# Patient Record
Sex: MALE | Race: WHITE | NOT HISPANIC OR LATINO | URBAN - METROPOLITAN AREA
[De-identification: names, ages, dates, MRNs, and addresses within clinical notes are randomized per-mention and may not be internally consistent; named-entity substitution may affect disease eponyms.]

---

## 2020-03-17 ENCOUNTER — INPATIENT (INPATIENT)
Facility: HOSPITAL | Age: 50
LOS: 0 days | Discharge: HOME | End: 2020-03-18
Attending: SURGERY | Admitting: SURGERY
Payer: COMMERCIAL

## 2020-03-17 VITALS
RESPIRATION RATE: 20 BRPM | HEART RATE: 88 BPM | TEMPERATURE: 99 F | OXYGEN SATURATION: 100 % | DIASTOLIC BLOOD PRESSURE: 84 MMHG | SYSTOLIC BLOOD PRESSURE: 167 MMHG

## 2020-03-17 PROCEDURE — 99285 EMERGENCY DEPT VISIT HI MDM: CPT

## 2020-03-17 RX ORDER — TETANUS TOXOID, REDUCED DIPHTHERIA TOXOID AND ACELLULAR PERTUSSIS VACCINE, ADSORBED 5; 2.5; 8; 8; 2.5 [IU]/.5ML; [IU]/.5ML; UG/.5ML; UG/.5ML; UG/.5ML
0.5 SUSPENSION INTRAMUSCULAR ONCE
Refills: 0 | Status: COMPLETED | OUTPATIENT
Start: 2020-03-17 | End: 2020-03-17

## 2020-03-17 RX ORDER — SODIUM CHLORIDE 9 MG/ML
1000 INJECTION INTRAMUSCULAR; INTRAVENOUS; SUBCUTANEOUS ONCE
Refills: 0 | Status: COMPLETED | OUTPATIENT
Start: 2020-03-17 | End: 2020-03-17

## 2020-03-18 VITALS
TEMPERATURE: 98 F | SYSTOLIC BLOOD PRESSURE: 124 MMHG | HEART RATE: 70 BPM | DIASTOLIC BLOOD PRESSURE: 76 MMHG | RESPIRATION RATE: 19 BRPM

## 2020-03-18 LAB
ALBUMIN SERPL ELPH-MCNC: 4.2 G/DL — SIGNIFICANT CHANGE UP (ref 3.5–5.2)
ALP SERPL-CCNC: 88 U/L — SIGNIFICANT CHANGE UP (ref 30–115)
ALT FLD-CCNC: 25 U/L — SIGNIFICANT CHANGE UP (ref 0–41)
ANION GAP SERPL CALC-SCNC: 12 MMOL/L — SIGNIFICANT CHANGE UP (ref 7–14)
APTT BLD: 30 SEC — SIGNIFICANT CHANGE UP (ref 27–39.2)
AST SERPL-CCNC: 23 U/L — SIGNIFICANT CHANGE UP (ref 0–41)
BASOPHILS # BLD AUTO: 0.03 K/UL — SIGNIFICANT CHANGE UP (ref 0–0.2)
BASOPHILS NFR BLD AUTO: 0.4 % — SIGNIFICANT CHANGE UP (ref 0–1)
BILIRUB SERPL-MCNC: 0.5 MG/DL — SIGNIFICANT CHANGE UP (ref 0.2–1.2)
BLD GP AB SCN SERPL QL: SIGNIFICANT CHANGE UP
BUN SERPL-MCNC: 16 MG/DL — SIGNIFICANT CHANGE UP (ref 10–20)
CALCIUM SERPL-MCNC: 8.9 MG/DL — SIGNIFICANT CHANGE UP (ref 8.5–10.1)
CHLORIDE SERPL-SCNC: 104 MMOL/L — SIGNIFICANT CHANGE UP (ref 98–110)
CO2 SERPL-SCNC: 25 MMOL/L — SIGNIFICANT CHANGE UP (ref 17–32)
CREAT SERPL-MCNC: 1.2 MG/DL — SIGNIFICANT CHANGE UP (ref 0.7–1.5)
EOSINOPHIL # BLD AUTO: 0.13 K/UL — SIGNIFICANT CHANGE UP (ref 0–0.7)
EOSINOPHIL NFR BLD AUTO: 1.6 % — SIGNIFICANT CHANGE UP (ref 0–8)
ETHANOL SERPL-MCNC: <10 MG/DL — SIGNIFICANT CHANGE UP
GLUCOSE SERPL-MCNC: 107 MG/DL — HIGH (ref 70–99)
HCT VFR BLD CALC: 47.2 % — SIGNIFICANT CHANGE UP (ref 42–52)
HGB BLD-MCNC: 16.6 G/DL — SIGNIFICANT CHANGE UP (ref 14–18)
IMM GRANULOCYTES NFR BLD AUTO: 0.1 % — SIGNIFICANT CHANGE UP (ref 0.1–0.3)
INR BLD: 1.17 RATIO — SIGNIFICANT CHANGE UP (ref 0.65–1.3)
LACTATE SERPL-SCNC: 1.8 MMOL/L — SIGNIFICANT CHANGE UP (ref 0.7–2)
LIDOCAIN IGE QN: 24 U/L — SIGNIFICANT CHANGE UP (ref 7–60)
LYMPHOCYTES # BLD AUTO: 3.35 K/UL — SIGNIFICANT CHANGE UP (ref 1.2–3.4)
LYMPHOCYTES # BLD AUTO: 40.4 % — SIGNIFICANT CHANGE UP (ref 20.5–51.1)
MCHC RBC-ENTMCNC: 32.2 PG — HIGH (ref 27–31)
MCHC RBC-ENTMCNC: 35.2 G/DL — SIGNIFICANT CHANGE UP (ref 32–37)
MCV RBC AUTO: 91.5 FL — SIGNIFICANT CHANGE UP (ref 80–94)
MONOCYTES # BLD AUTO: 0.77 K/UL — HIGH (ref 0.1–0.6)
MONOCYTES NFR BLD AUTO: 9.3 % — SIGNIFICANT CHANGE UP (ref 1.7–9.3)
NEUTROPHILS # BLD AUTO: 4.01 K/UL — SIGNIFICANT CHANGE UP (ref 1.4–6.5)
NEUTROPHILS NFR BLD AUTO: 48.2 % — SIGNIFICANT CHANGE UP (ref 42.2–75.2)
NRBC # BLD: 0 /100 WBCS — SIGNIFICANT CHANGE UP (ref 0–0)
PLATELET # BLD AUTO: 190 K/UL — SIGNIFICANT CHANGE UP (ref 130–400)
POTASSIUM SERPL-MCNC: 3.3 MMOL/L — LOW (ref 3.5–5)
POTASSIUM SERPL-SCNC: 3.3 MMOL/L — LOW (ref 3.5–5)
PROT SERPL-MCNC: 6.4 G/DL — SIGNIFICANT CHANGE UP (ref 6–8)
PROTHROM AB SERPL-ACNC: 13.5 SEC — HIGH (ref 9.95–12.87)
RBC # BLD: 5.16 M/UL — SIGNIFICANT CHANGE UP (ref 4.7–6.1)
RBC # FLD: 13.1 % — SIGNIFICANT CHANGE UP (ref 11.5–14.5)
SODIUM SERPL-SCNC: 141 MMOL/L — SIGNIFICANT CHANGE UP (ref 135–146)
WBC # BLD: 8.3 K/UL — SIGNIFICANT CHANGE UP (ref 4.8–10.8)
WBC # FLD AUTO: 8.3 K/UL — SIGNIFICANT CHANGE UP (ref 4.8–10.8)

## 2020-03-18 PROCEDURE — 99223 1ST HOSP IP/OBS HIGH 75: CPT

## 2020-03-18 PROCEDURE — 74174 CTA ABD&PLVS W/CONTRAST: CPT | Mod: 26

## 2020-03-18 PROCEDURE — 71045 X-RAY EXAM CHEST 1 VIEW: CPT | Mod: 26

## 2020-03-18 PROCEDURE — 71275 CT ANGIOGRAPHY CHEST: CPT | Mod: 26

## 2020-03-18 RX ORDER — INFLUENZA VIRUS VACCINE 15; 15; 15; 15 UG/.5ML; UG/.5ML; UG/.5ML; UG/.5ML
0.5 SUSPENSION INTRAMUSCULAR ONCE
Refills: 0 | Status: DISCONTINUED | OUTPATIENT
Start: 2020-03-18 | End: 2020-03-18

## 2020-03-18 RX ORDER — POTASSIUM CHLORIDE 20 MEQ
20 PACKET (EA) ORAL
Refills: 0 | Status: COMPLETED | OUTPATIENT
Start: 2020-03-18 | End: 2020-03-18

## 2020-03-18 RX ORDER — CEFAZOLIN SODIUM 1 G
2000 VIAL (EA) INJECTION ONCE
Refills: 0 | Status: COMPLETED | OUTPATIENT
Start: 2020-03-18 | End: 2020-03-18

## 2020-03-18 RX ADMIN — Medication 50 MILLIEQUIVALENT(S): at 11:31

## 2020-03-18 RX ADMIN — SODIUM CHLORIDE 1000 MILLILITER(S): 9 INJECTION INTRAMUSCULAR; INTRAVENOUS; SUBCUTANEOUS at 00:27

## 2020-03-18 RX ADMIN — Medication 50 MILLIEQUIVALENT(S): at 08:09

## 2020-03-18 RX ADMIN — TETANUS TOXOID, REDUCED DIPHTHERIA TOXOID AND ACELLULAR PERTUSSIS VACCINE, ADSORBED 0.5 MILLILITER(S): 5; 2.5; 8; 8; 2.5 SUSPENSION INTRAMUSCULAR at 00:27

## 2020-03-18 RX ADMIN — Medication 100 MILLIGRAM(S): at 03:10

## 2020-03-18 RX ADMIN — Medication 50 MILLIEQUIVALENT(S): at 05:40
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
## 2020-03-18 NOTE — H&P ADULT - NSHPPHYSICALEXAM_GEN_ALL_CORE
Vital Signs Last 24 Hrs  T(C): 37 (17 Mar 2020 23:47), Max: 37 (17 Mar 2020 23:47)  T(F): 98.6 (17 Mar 2020 23:47), Max: 98.6 (17 Mar 2020 23:47)  HR: 86 (18 Mar 2020 00:10) (85 - 88)  BP: 142/97 (18 Mar 2020 00:10) (142/97 - 167/84)  RR: 14 (18 Mar 2020 00:10) (14 - 23)  SpO2: 100% (18 Mar 2020 00:10) (99% - 100%)    PHYSICAL EXAM:  Constitutional: AAOx3, NAD, calm and cooperative  HEENT: DEBORAH, EOMI, NCAT, trachea Midline, neck supple, no cervical tenderness  Respiratory: CTABL, good air entry, normal respiratory effort, no W/R/C  Cardiovascular: RRR, S1, S2, no M/R/G  Gastrointestinal: laceration sustained to R periumbilical area 4.5cm transverse dimension with exposed subcutaneous fat, oozing, no pulsatile bleed, expanding hematoma, evisceration, succus, or apparent violation of fascia; abdomen is soft, non-tender, non-distended, +BS, no rebound tenderness or guarding.  Genitourinary: no trauma to perineum or groins, normal male genitalia  Rectal: normal tone, no blood, +soft stool  Extremities: FROM, Motor 5/5, compartments soft, no extremity signs of trauma  Vascular: peripheral pulses 2+ throughout, palpable; extremities warm  Neurological: GCS 15, AAOx3, peripheral sensation intact and equal throughout. No spinal or perispinal tenderness, step-offs, or deformities  Skin: Laceration as described, no other lacerations, ecchymosis, or abrasions, skin normal color, warm, dry

## 2020-03-18 NOTE — H&P ADULT - NSHPLABSRESULTS_GEN_ALL_CORE
LABS:  CAPILLARY BLOOD GLUCOSE                        16.6   8.30  )-----------( 190      ( 17 Mar 2020 23:50 )             47.2     03-17  141  |  104  |  16  ----------------------------<  107<H>  3.3<L>   |  25  |  1.2    Ca    8.9      17 Mar 2020 23:50    TPro  6.4  /  Alb  4.2  /  TBili  0.5  /  DBili  x   /  AST  23  /  ALT  25  /  AlkPhos  88  03-17  Lipase, Serum: 24 U/L (03-17-20 @ 23:50)  Lactate, Blood: 1.8 mmol/L (03-17-20 @ 23:50)    PT/INR - ( 17 Mar 2020 23:50 )   PT: 13.50 sec;   INR: 1.17 ratio    PTT - ( 17 Mar 2020 23:50 )  PTT:30.0 sec    Alcohol, Blood: <10 mg/dL (03-17-20 @ 23:50)    IMAGING  < from: CT Angio Abdomen and Pelvis w/ IV Cont (03.18.20 @ 00:44) >  IMPRESSION:   Periumbilical skin laceration extending into mesenteric fat without evidence of active arterial extravasation or visceral organ injury.  < end of copied text >
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
## 2020-03-18 NOTE — CHART NOTE - NSCHARTNOTEFT_GEN_A_CORE
Patient was seen and examined at bedside. VItal and PE as below. Overall exam unchanged.    T(F): 97.7 (03-18-20 @ 04:21), Max: 98.6 (03-17-20 @ 23:47)  HR: 60 (03-18-20 @ 04:21) (60 - 88)  BP: 128/77 (03-18-20 @ 04:21) (122/79 - 167/84)  RR: 19 (03-18-20 @ 04:21) (14 - 23)  SpO2: 100% (03-18-20 @ 01:28) (99% - 100%)    PHYSICAL EXAM:  GENERAL: NAD, well-appearing  Abdomen: Soft, NTND, no guarding or rigidity, unchanged from previous exam, laceration R periumbilical area packed and dressed with gauze and tape, mild saturation, no active bleeding noted.
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
## 2020-03-18 NOTE — DISCHARGE NOTE PROVIDER - CARE PROVIDER_API CALL
Nick Gan)  Surgery; Surgical Critical Care  [address] [place] [address], [address] [place] [address]  Phone: [phone]  Fax: [phone]  Follow Up Time: 2 weeks

## 2020-03-18 NOTE — DISCHARGE NOTE NURSING/CASE MANAGEMENT/SOCIAL WORK - PATIENT PORTAL LINK FT
You can access the FollowMyHealth Patient Portal offered by [facility] by registering at the following website: [email or web address]. By joining [employer]’s FollowMyHealth portal, you will also be able to view your health information using other applications (apps) compatible with our system.
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
## 2020-03-18 NOTE — CHART NOTE - NSCHARTNOTEFT_GEN_A_CORE
Patient was seen and examined at bedside. Vitals and PE documented below.    T(F): 98.5 (03-18-20 @ 01:28), Max: 98.6 (03-17-20 @ 23:47)  HR: 78 (03-18-20 @ 01:28) (78 - 88)  BP: 137/86 (03-18-20 @ 01:28) (137/86 - 167/84)  RR: 20 (03-18-20 @ 01:28) (14 - 23)  SpO2: 100% (03-18-20 @ 01:28) (99% - 100%)      PHYSICAL EXAM:  GENERAL: NAD, well-appearing  Abdomen: Soft, NTND, no guarding or rigidity, unchanged from previous exam, laceration R periumbilical area packed and dressed with gauze and tape, mild saturation, no active bleeding noted.
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
## 2020-03-18 NOTE — ED PROVIDER NOTE - PHYSICAL EXAMINATION
VITALS:  I have reviewed the initial vital signs.  GENERAL: Well-developed, well-nourished, in no acute distress.  HEENT: NC/AT. Sclera clear. EOMI, PERRLA. No epistaxis. No malocclusion. No facial bony ttp.  NECK: supple w FROM. No paraspinal muscle ttp. No midline cervical spinous tenderness, step offs, or deformity.  CARDIO: RRR, nl S1 and S2. No murmurs, rubs, or gallops. 2+ radial and pedal pulses bilaterally. No chest wall tenderness.  PULM: Normal effort. No tachypnea or retractions. No flail chest. CTA b/l without wheezes, rales, or rhonchi. Good air entry b/l.  MSK: FROM to extremities x4 w/o swelling/erythema/ecchymosis/deformity/ttp. No midline thoracic or lumbar spinous tenderness, step offs, or deformity. Pelvis stable.   GI: Abdomen soft and non-distended. Nontender throughout.  SKIN: Warm, dry. 6 cm linear lac just below umbilicus with active bleeding. +subcutaneous fat visualized, , qtip enters approximately 3 cm past epidermis. No bubbling or extrusion of bowel.  NEURO: A&Ox3. Speech clear. No gross motor/sensory deficits.

## 2020-03-18 NOTE — ED PROVIDER NOTE - NS ED ROS FT
EYES: (-) vision changes, (-) blurry vision, (-) double vision, (-) photophobia, (-) eye pain  ENT: (-) difficulty swallowing, (-) facial swelling  NECK: (-) neck pain, (-) neck stiffness  CARDIO: (-) chest pain, (-) palpitations  PULM: (-) cough, (-) sputum, (-) shortness of breath, (-) wheezing, (-) hemoptysis, (-) stridor  GI: see HPI, (-) nausea, (-) vomiting, (-) bowel incontinence/retention  : (-) incontinence (-) flank pain  HEME: (-) easy bruising, (-) easy bleeding  MSK: (-) back pain, (-) joint pain, (-) deformity, (-) joint swelling, (-) gait difficulty  SKIN: see HPI, (-) ecchymosis,  NEURO: (-) headache, (-) head injury, (-) LOC, (-) dizziness, (-) lightheadedness, (-) syncope, (-) speech changes, (-) confusion, (-) numbness, (-) weakness, (-) paresthesias, (-) seizures    *all other systems negative except as documented above and in the HPI*
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
## 2020-03-18 NOTE — ED ADULT NURSE NOTE - NSHOSCREENINGQ1_ED_ALL_ED
No Pt on Xarelto.  Per pt, he has appt with Dr. Tate, PMD/ Cardiologist for clearance.  Dr. Tate to advise ot regarding when to stop Xarelto.

## 2020-03-20 DIAGNOSIS — S31.615A: ICD-10-CM
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
## 2020-11-30 ENCOUNTER — EMERGENCY (EMERGENCY)
Facility: HOSPITAL | Age: 50
LOS: 0 days | Discharge: HOME | End: 2020-11-30
Attending: EMERGENCY MEDICINE | Admitting: EMERGENCY MEDICINE
Payer: COMMERCIAL

## 2020-11-30 VITALS
HEART RATE: 87 BPM | DIASTOLIC BLOOD PRESSURE: 80 MMHG | RESPIRATION RATE: 19 BRPM | SYSTOLIC BLOOD PRESSURE: 122 MMHG | TEMPERATURE: 103 F

## 2020-11-30 DIAGNOSIS — U07.1 COVID-19: ICD-10-CM

## 2020-11-30 DIAGNOSIS — R50.9 FEVER, UNSPECIFIED: ICD-10-CM

## 2020-11-30 PROBLEM — Z78.9 OTHER SPECIFIED HEALTH STATUS: Chronic | Status: ACTIVE | Noted: 2020-03-18

## 2020-11-30 LAB
ALBUMIN SERPL ELPH-MCNC: 4.1 G/DL — SIGNIFICANT CHANGE UP (ref 3.5–5.2)
ALP SERPL-CCNC: 94 U/L — SIGNIFICANT CHANGE UP (ref 30–115)
ALT FLD-CCNC: 20 U/L — SIGNIFICANT CHANGE UP (ref 0–41)
ANION GAP SERPL CALC-SCNC: 11 MMOL/L — SIGNIFICANT CHANGE UP (ref 7–14)
AST SERPL-CCNC: 21 U/L — SIGNIFICANT CHANGE UP (ref 0–41)
BASOPHILS # BLD AUTO: 0 K/UL — SIGNIFICANT CHANGE UP (ref 0–0.2)
BASOPHILS NFR BLD AUTO: 0 % — SIGNIFICANT CHANGE UP (ref 0–1)
BILIRUB SERPL-MCNC: 0.3 MG/DL — SIGNIFICANT CHANGE UP (ref 0.2–1.2)
BUN SERPL-MCNC: 12 MG/DL — SIGNIFICANT CHANGE UP (ref 10–20)
CALCIUM SERPL-MCNC: 9 MG/DL — SIGNIFICANT CHANGE UP (ref 8.5–10.1)
CHLORIDE SERPL-SCNC: 97 MMOL/L — LOW (ref 98–110)
CO2 SERPL-SCNC: 27 MMOL/L — SIGNIFICANT CHANGE UP (ref 17–32)
CREAT SERPL-MCNC: 1.3 MG/DL — SIGNIFICANT CHANGE UP (ref 0.7–1.5)
EOSINOPHIL # BLD AUTO: 0 K/UL — SIGNIFICANT CHANGE UP (ref 0–0.7)
EOSINOPHIL NFR BLD AUTO: 0 % — SIGNIFICANT CHANGE UP (ref 0–8)
GLUCOSE SERPL-MCNC: 113 MG/DL — HIGH (ref 70–99)
HCT VFR BLD CALC: 52.9 % — HIGH (ref 42–52)
HGB BLD-MCNC: 18.1 G/DL — HIGH (ref 14–18)
LYMPHOCYTES # BLD AUTO: 1.21 K/UL — SIGNIFICANT CHANGE UP (ref 1.2–3.4)
LYMPHOCYTES # BLD AUTO: 33 % — SIGNIFICANT CHANGE UP (ref 20.5–51.1)
MANUAL SMEAR VERIFICATION: SIGNIFICANT CHANGE UP
MCHC RBC-ENTMCNC: 31.3 PG — HIGH (ref 27–31)
MCHC RBC-ENTMCNC: 34.2 G/DL — SIGNIFICANT CHANGE UP (ref 32–37)
MCV RBC AUTO: 91.5 FL — SIGNIFICANT CHANGE UP (ref 80–94)
MONOCYTES # BLD AUTO: 0.26 K/UL — SIGNIFICANT CHANGE UP (ref 0.1–0.6)
MONOCYTES NFR BLD AUTO: 7 % — SIGNIFICANT CHANGE UP (ref 1.7–9.3)
NEUTROPHILS # BLD AUTO: 2.21 K/UL — SIGNIFICANT CHANGE UP (ref 1.4–6.5)
NEUTROPHILS NFR BLD AUTO: 60 % — SIGNIFICANT CHANGE UP (ref 42.2–75.2)
NRBC # BLD: 0 /100 — SIGNIFICANT CHANGE UP (ref 0–0)
NRBC # BLD: SIGNIFICANT CHANGE UP /100 WBCS (ref 0–0)
PLAT MORPH BLD: NORMAL — SIGNIFICANT CHANGE UP
PLATELET # BLD AUTO: 113 K/UL — LOW (ref 130–400)
POTASSIUM SERPL-MCNC: 4.5 MMOL/L — SIGNIFICANT CHANGE UP (ref 3.5–5)
POTASSIUM SERPL-SCNC: 4.5 MMOL/L — SIGNIFICANT CHANGE UP (ref 3.5–5)
PROT SERPL-MCNC: 7 G/DL — SIGNIFICANT CHANGE UP (ref 6–8)
RBC # BLD: 5.78 M/UL — SIGNIFICANT CHANGE UP (ref 4.7–6.1)
RBC # FLD: 12.9 % — SIGNIFICANT CHANGE UP (ref 11.5–14.5)
RBC BLD AUTO: NORMAL — SIGNIFICANT CHANGE UP
SODIUM SERPL-SCNC: 135 MMOL/L — SIGNIFICANT CHANGE UP (ref 135–146)
WBC # BLD: 3.68 K/UL — LOW (ref 4.8–10.8)
WBC # FLD AUTO: 3.68 K/UL — LOW (ref 4.8–10.8)

## 2020-11-30 PROCEDURE — 93010 ELECTROCARDIOGRAM REPORT: CPT

## 2020-11-30 PROCEDURE — 71045 X-RAY EXAM CHEST 1 VIEW: CPT | Mod: 26

## 2020-11-30 PROCEDURE — 99284 EMERGENCY DEPT VISIT MOD MDM: CPT

## 2020-11-30 RX ORDER — ACETAMINOPHEN 500 MG
650 TABLET ORAL ONCE
Refills: 0 | Status: COMPLETED | OUTPATIENT
Start: 2020-11-30 | End: 2020-11-30

## 2020-11-30 RX ORDER — SODIUM CHLORIDE 9 MG/ML
1000 INJECTION, SOLUTION INTRAVENOUS ONCE
Refills: 0 | Status: COMPLETED | OUTPATIENT
Start: 2020-11-30 | End: 2020-11-30

## 2020-11-30 RX ADMIN — Medication 650 MILLIGRAM(S): at 04:37

## 2020-11-30 RX ADMIN — SODIUM CHLORIDE 1000 MILLILITER(S): 9 INJECTION, SOLUTION INTRAVENOUS at 04:38

## 2020-11-30 NOTE — ED PROVIDER NOTE - NSFOLLOWUPINSTRUCTIONS_ED_ALL_ED_FT
You have tested POSITIVE for the novel coronavirus (COVID-19). Upon discharge, you must self-quarantine for 14 days, or until the Department of Health contacts you. Please wear a face mask if you are around other individuals. Try to avoid contact with house members, family, and friends for the duration of this quarantine. Please follow up with your primary care physician within 2-3 weeks of your discharge from [facility]. Please take all medications as prescribed. If you experience any worsening or recurrence of your symptoms please call 9-1-1 immediately or report to the nearest Emergency Department. If you have any questions or concerns, please do not hesitate to call the hospital at [phone].

## 2020-11-30 NOTE — ED PROVIDER NOTE - PATIENT PORTAL LINK FT
You can access the FollowMyHealth Patient Portal offered by [facility] by registering at the following website: [email or web address]. By joining [employer]’s FollowMyHealth portal, you will also be able to view your health information using other applications (apps) compatible with our system.

## 2020-11-30 NOTE — ED PROVIDER NOTE - ATTENDING CONTRIBUTION TO CARE
I personally evaluated the patient. I reviewed the Resident’s or Physician Assistant’s note (as assigned above), and agree with the findings and plan except as documented in my note.    Pt is a 49 y/o male with no PMH who presents to ED for 8 days of cough, SOb, fever, body aches, mild, constant, unchanged since onset. Tested positive for COVID 8 days ago. No diarrhea, vomiting, chest pain. Pt has pulse ox at home, has been between 94-96.    Constitutional: Well developed, well nourished. NAD.  Head: Normocephalic, atraumatic.  Eyes: PERRL. EOMI.  ENT: No nasal discharge. Mucous membranes moist.  Neck: Supple. Painless ROM.  Cardiovascular: Normal S1, S2. Regular rate and rhythm. No murmurs, rubs, or gallops.  Pulmonary: Normal respiratory rate and effort. Lungs clear to auscultation bilaterally. No wheezing, rales, or rhonchi.  Abdominal: Soft. Nondistended. Nontender. No rebound, guarding, rigidity.  Extremities. Pelvis stable. No lower extremity edema, symmetric calves.  Skin: No rashes, cyanosis.  Neuro: AAOx3. No focal neurological deficits.  Psych: Normal mood. Normal affect.

## 2020-11-30 NOTE — ED PROVIDER NOTE - NS ED ROS FT
Eyes:  No visual changes, eye pain or discharge.  ENMT:  No hearing changes, pain, discharge or infections. No neck pain or stiffness.  Cardiac:  No chest pain, SOB or edema. No chest pain with exertion.  Respiratory:  +cough. No respiratory distress. No hemoptysis. No history of asthma or RAD.  GI:  No nausea, vomiting, diarrhea or abdominal pain.  :  No dysuria, frequency or burning.  MS:  +myalgias  Neuro:  +headache. No LOC.  Skin:  No skin rash.   Endocrine: No history of thyroid disease or diabetes.

## 2020-11-30 NOTE — ED PROVIDER NOTE - OBJECTIVE STATEMENT
51 y/o M with no pmh presenting for fever, cough. Patient tested + for COVID this past Tuesday. States that he has been experiencing dry cough, myalgias, headache, fever x8 days. Cough is not productive. Denies SOB, no CP, no n/v, diarrhea, abd. pain. Has been taking Tylenol but w/ minimal relief. Lives w/ wife at home who is also COVID+. O2 sat. here is 95% on RA; O2 sat. remained at 95-96% while marching in place.

## 2020-11-30 NOTE — ED PROVIDER NOTE - CLINICAL SUMMARY MEDICAL DECISION MAKING FREE TEXT BOX
Pt presented to ED for SOB, cough, fever. + COVID. Labs, imaging obtained. VSS. Pulse ox above 94 when ambulating. Pt will get pulse ox with dc. Results reviewed and discussed with pt and printed for patient. Anticipatory guidance given including close outpatient followup. Strict return precautions given. Pt verbalizes understanding of and agrees with plan.

## 2020-12-28 PROBLEM — Z00.00 ENCOUNTER FOR PREVENTIVE HEALTH EXAMINATION: Status: ACTIVE | Noted: 2020-12-28

## 2021-02-22 ENCOUNTER — OUTPATIENT (OUTPATIENT)
Dept: OUTPATIENT SERVICES | Facility: HOSPITAL | Age: 51
LOS: 1 days | Discharge: HOME | End: 2021-02-22
Payer: COMMERCIAL

## 2021-02-22 DIAGNOSIS — I71.2 THORACIC AORTIC ANEURYSM, WITHOUT RUPTURE: ICD-10-CM

## 2021-02-22 PROCEDURE — 71275 CT ANGIOGRAPHY CHEST: CPT | Mod: 26

## 2021-04-13 NOTE — ED ADULT TRIAGE NOTE - WEIGHT IN KG
[facility]  [place] INPATIENT ENCOUNTER  PHYSICAL MEDICINE AND REHABILITATION  DAILY PROGRESS NOTE    ADMISSION DATE:  3/27/2021  DATE:  4/13/2021  CURRENT HOSPITAL DAY:  Hospital Day: 18  ATTENDING PHYSICIAN:  Magalis Shannon MD  CODE STATUS:  Full Resuscitation    CHIEF COMPLAINT:  Neuropathy of right sciatic nerve  DVT RLE    4/12 Patient was seen and examined.  He is alert and awake this morning.  Patient reported pain managed on current regimen. Magnesium 2.0, phosphorus 4.2. B&B functioning.  Patient on scheduled tramadol and Flexeril.  Patient taking as needed Norco.  Discharge planning in progress and pain service following for pain management regimen.  We will continue to follow.  Patient participating in therapies.    4/13 Patient seen and examined. Patient more awake and alert now with current pain regimen. Patient still tachycardic 109-118. - 133.  Patient denies HA, dizziness, nausea.  Last BM 4/11 and patient voiding.    Patient continues to participate in therapies and is supervision for bed mobility, contact-guard assist for transfers and total assistance for ambulation of 50 feet x 2.  Patient using Trena left.  Therapies will continue.    MEDICATIONS:    Current Facility-Administered Medications   Medication   • HYDROcodone-acetaminophen (NORCO)  MG per tablet 1 tablet   • gabapentin (NEURONTIN) capsule 1,200 mg   • traMADol (ULTRAM) tablet 50 mg   • docusate sodium-sennosides (SENOKOT S) 50-8.6 MG 2 tablet   • polyethylene glycol (MIRALAX) packet 17 g   • bisacodyl (DULCOLAX) EC tablet 5 mg   • metoPROLOL tartrate (LOPRESSOR) tablet 25 mg   • DULoxetine (CYMBALTA) capsule 60 mg   • cyclobenzaprine (FLEXERIL) tablet 5 mg   • cyclobenzaprine (FLEXERIL) tablet 10 mg   • bisacodyl (DULCOLAX) suppository 10 mg   • magnesium hydroxide (MILK OF MAGNESIA) 400 MG/5ML suspension 30 mL   • acetaminophen (TYLENOL) tablet 650 mg   • clonazePAM (KlonoPIN) tablet 1 mg   • divalproex (DEPAKOTE) delayed  release EC tablet 500 mg   • melatonin tablet 3 mg   • pantoprazole (PROTONIX) EC tablet 40 mg   • thiamine (VITAMIN B1) tablet 100 mg   • apixaBAN (ELIQUIS) tablet 5 mg       HISTORIES:     I have reviewed the past medical history, family history, social history, medications and allergies listed in the medical record as obtained by my nursing staff and support staff and agree with their documentation.     REVIEW OF SYSTEMS:  Review of Systems   Constitutional: Negative for chills and fever.   Respiratory: Negative for shortness of breath.    Cardiovascular: Negative for chest pain.   Gastrointestinal: Negative for nausea and vomiting.   Neurological: Positive for sensory change, focal weakness and weakness.   Psychiatric/Behavioral:        His affect is brighter though flat   All other systems reviewed and are negative.      OBJECTIVE:    VITAL SIGNS:     Vital Last Value 24 Hour Range   Temperature 98.4 °F (36.9 °C) (04/13/21 0617) Temp  Min: 97.7 °F (36.5 °C)  Max: 98.4 °F (36.9 °C)   Pulse (!) 109 (04/13/21 0617) Pulse  Min: 109  Max: 119   Respiratory 16 (04/13/21 0617) Resp  Min: 16  Max: 17   Non-Invasive  Blood Pressure (!) 133/91 (04/13/21 0617) BP  Min: 116/75  Max: 133/91   Pulse Oximetry 98 % (04/13/21 0617) SpO2  Min: 97 %  Max: 99 %     Vital Today Admitted   Weight 94.3 kg (207 lb 14.3 oz) (03/27/21 1254) Weight: 94.3 kg (207 lb 14.3 oz) (03/27/21 1254)       INTAKE/OUTPUT:      Intake/Output Summary (Last 24 hours) at 4/13/2021 0708  Last data filed at 4/13/2021 0600  Gross per 24 hour   Intake --   Output 2950 ml   Net -2950 ml       Bowel: Stool Amount: Medium (04/11/21 2040)  Stool Occurrence: 1(soft and continent in toilet ) (04/11/21 2040)     PVR:      PHYSICAL EXAMINATION:  Physical Exam  Vitals reviewed.   Constitutional:       General: He is not in acute distress.     Appearance: Normal appearance. He is not ill-appearing, toxic-appearing or diaphoretic.   HENT:      Head: Normocephalic and  atraumatic.      Right Ear: External ear normal.      Left Ear: External ear normal.      Mouth/Throat:      Mouth: Mucous membranes are moist.      Pharynx: Oropharynx is clear. No posterior oropharyngeal erythema.   Eyes:      General: No scleral icterus.     Extraocular Movements: Extraocular movements intact.      Conjunctiva/sclera: Conjunctivae normal.   Cardiovascular:      Rate and Rhythm: Tachycardia present.      Heart sounds: Normal heart sounds.   Pulmonary:      Effort: Pulmonary effort is normal. No respiratory distress.      Breath sounds: Normal breath sounds. No wheezing or rhonchi.   Abdominal:      General: Bowel sounds are normal. There is no distension.      Palpations: Abdomen is soft.      Tenderness: There is no abdominal tenderness. There is no guarding or rebound.   Musculoskeletal:         General: Tenderness present. No swelling or deformity.      Cervical back: Normal range of motion. No rigidity.      Right lower leg: No edema.      Left lower leg: No edema.   Skin:     General: Skin is warm and dry.      Findings: No rash.   Neurological:      Mental Status: He is alert and oriented to person, place, and time.      Sensory: Sensory deficit present.      Motor: Weakness present.      Gait: Gait abnormal.      Comments: The patient has good functional strength of bilateral upper extremities.  Flexion on the right 4/5 and on the left 4+/5.  Trace knee extension on rt , rt knee flexion 2-/5 absent rt ankle DF 3-/5  .  Lt knee ext 2/5 and Lt ankle DF/Pf 3+/5   Sensation is altered distally from the thighs.    Psychiatric:         Mood and Affect: Mood normal.         Behavior: Behavior normal.       LABORATORY DATA:    No results found    Recent Labs   Lab 04/12/21  0552 04/08/21  0641   SODIUM 136 137   CHLORIDE 103 102   BUN 19 18   BCRAT 20 20   POTASSIUM 4.2 3.7   GLUCOSE 101* 93   CREATININE 0.93 0.89   CALCIUM 9.5 9.7       No results found    No results found    IMAGING STUDIES:   US  VASC EXTREMITY LOWER VENOUS DUPLEX   Final Result      Likely chronic thrombosis with signs of recanalization in the right   popliteal vein in the distal segment, right posterior tibial and peroneal   veins in the calf.  No evidence of acute DVT.      Electronically Signed by: DONALDO MAS M.D.    Signed on: 4/2/2021 12:02 PM          NM LUNG PERFUSION IMAGING   Final Result   Low probability study for pulmonary embolism.      Electronically Signed by: DONALDO MAS M.D.    Signed on: 3/30/2021 4:01 PM              ASSESSMENT/PLAN:     * Neuropathy of right sciatic nerve  Assessment & Plan  Neurology is following.  MRI of the spine negative.  EMG Consistent with RT sciatic neuropathy.  3/28 ongoing physical and occupational therapies.  Evaluate need for AFO.   patient on gabapentin 800 mg 3 times daily, monitor  4/8  Dr. Jhon Diehl neurology is following.  Patient has bilateral quadricep weakness right greater than left etiology?  4/9 pain service and neurology following.  Patient did not complain of my visit.    Acute deep vein thrombosis (DVT) of calf muscle vein of right lower extremity (CMS/HCC)  Assessment & Plan  Ultrasound on 3/18 with finding of right calf vein DVT.  Ultrasound on 3/25 with findings of propagation of right calf vein DVT into right popliteal vein.  Doppler on 3/26 is stable.  Hematology consulted.  On full dose Lovenox x3 days with transition to Eliquis transition orders already placed in the chart.  3/28 continue to monitor.  No signs of abnormal bleeding or bruising.  No signs of further acute thromboembolism.  3/29: Transition to Eliquis.  Monitor for signs of bleeding.  We will repeat Doppler of the right lower extremity on 4/2  3/31 imaging yesterday negative for PE.  Patient on Eliquis  4/2 repeat Dopplers done today.  Pending results  4/3/2021 Doppler results noted no sign of acute DVT.  -Signs of recanalization and multiple right lower extremity vessels  4/8 patient is on  apixaban    Gait difficulty  AsseSment & Plan  4/08Physical and Occupational Therapy.  Fall precautions.  4/12 Therapies continued. Will monitor    Nerve pain  Assessment & Plan  Adult pain service following.  Neurontin and Flexeril were increased, tramadol scheduled every 6 hours.  gabapentin 1000 mg 3x daily, patient is also on Norco 5 2 tablets every 4 hours as needed.  Patient takes the Norco 5 2 tablets 4-5 times per day 04/08 discussed with pain service patient with history of addiction and polysubstance abuse.  Asked pain service to try to start weaning the Norco and tramadol as tolerated.  Discussed with pain service will need a plan for patient to follow-up/received his pain medications after discharge from the hospital  4/12 Pain controlled, monitor  4/13 patient reports pain managed.  Monitor    bipolar 2 disorder (CMS/[facility])  Assessment & Plan  Recent suicide attempt.  Psychiatry is following.  Medications being adjusted    4/3/2021 interactive, cooperative and participatory during my examination.    4/8/2021 mood and affect mood stable.  Dr. Hua Kahn following note appreciated   4/9 patient seen by psychiatry.  Mood stable.  Monitor  4/12, 4/13 Mood stable, monitor    sinus tachycardia  Assessment & Plan  3/30: Mild improvement with IV fluids.  Scheduled for VQ scan and echo on 3/30  3/31 patient with continued intermittent tachycardia.  Work-up yesterday for PE negative.  We will continue to monitor.  4/1 heart rate improved and in the 90s.  Continue to monitor  4/2 heart rate 101, monitor  4/3/2021 most recent pulse rate range   4/4/2021 most recent max pulse rate 120.  If no improvement after medication administration may consider reconsultation with cardiology  4/7 patient started on metoprolol, with intermittent tachycardia heart rate heart rate mostly 83-95.  He did have one episode of pulse 111 today  4/9 patient continues intermittent tachycardia-113.  Patient on Lopressor.   Monitor  4/12 , cpm and monitor  4/13 heart rate 109-118, CPM monitor    anxiety  Assessment & Plan  Depakote, Cymbalta, and Klonopin  Psychiatry and psychology are following   stable, CPM and monitor    4/3/2021 today patient without emotional ability during my examination.  He is calm, cooperative and interactive  4/4/2021 today's examination continues  Patient awake alert pleasant cooperative.  -No emotional lability no tearing no crying agitation or combativeness on my exam  4/8 mood appears stable, monitor psychiatry Dr. Kahn following patient is on Depakote, Cymbalta and Klonopin  4/9 mood stable, monitor    Polysubstance abuse (CMS/[facility])  Assessment & Plan  Recent suicide attempt.  Encourage cessation  4/6 Dr. Deepa Cornejo and psychiatry service following  g.  Recommendation for outpatient substance abuse/psychiatry program to treat duo diagnosis     acute renal failure with tubular necrosis (CMS/[facility])  Assessment & Plan  Did require hemodialysis.  Nephrology is following.  Continue to monitor renal status.  Stable.  3/31 dialysis catheter removed yesterday.  Nephrology following  4/1 BUN 23, creatinine 1.10.  Continues to trend up.  Will update nephrology  4/2 patient hypotensive in therapy yesterday with SBP in the 70s and patient complaint of dizziness.  500 cc IVF bolus given.  BUN 18, creatinine 1.02  4/3/2021 today's renal panel shows BUN 18 and creatinine 0.90  4/4/2021 today's renal panel shows BUN 18 and creatinine 0.99.  -Other electrolytes sodium 137, potassium 4.3, chloride 102 and bicarb 30.  4/5 phosphorus trending up 5.0 today and BUN trending up 21, creatinine 0.96.  Renal following       4/06 patient with drop in blood pressure with standing.  He received bolus of 500 cc of 0.9 normal saline and 4/5  Patient with orthostatic hypotension.  He was on Flomax for urinary retention Flomax discontinued by nephrology service  4/7 per nephrology service patient's hypotension may be secondary to  polypharmacy as well asl loss of muscle tone and deconditioning.  Hopefully, metoprolol can continue to control patient's heart rate  4/08 patient's blood pressure under better controlled today.  Nephrology following  4/9 nephrology following.  Renal panel on 4/8 show BUN of 18, creatinine 0.89.  4/12 BUN 19, creat 0.93 stable, monitor    Principal Problem:    Neuropathy of right sciatic nerve  Active Problems:    Acute deep vein thrombosis (DVT) of calf muscle vein of right lower extremity (CMS/HCC)    Sinus tachycardia    Bipolar 2 disorder (CMS/HCC)    Nerve pain    Gait difficulty    Acute renal failure with tubular necrosis (CMS/HCC)    Polysubstance abuse (CMS/HCC)    Anxiety     82

## 2021-11-12 NOTE — ED ADULT NURSE NOTE - SUICIDE SCREENING QUESTION 3
70yo M with a reported history of insomnia and mild depression, chronic back pain who presents with withdrawal, lethargy, confusion, periods of disorientation and emotional lability suggestive of hypoactive delirium in the setting of ICU admission for management of covid pneumonia. Earlier comments about wishing to die may have been made while in an acute confusional state or as a means of expressing distress; no persisting SI present on exam today, no attempts at self-harm, and collateral from daughter reassuring re: absence of any history of significant depression or suicidality, daughter in agreement that pt is overall improved in mental state today compared to last several days. Component of adjustment sx also likely given prolonged hospitalization and acute illness contributing to mood symptoms observed. At this time, acute suicide risk assessed to be low but would be cautious for any recurrent SI given fluctuating nature of delirium and possible exacerbation of mood sx with use of steroids. Anticipate improvement as acute illness resolves but can reassess if sx persist or return.    RECOMMENDATIONS  -enhanced supervision given confusional state and recent suicidal statements; low threshold for 1:1 observation  -delirium precautions  -if lethargy persists, would consider discontinuing outpt mirtazapine (per daughter, not prescribed for depression)  -agree with minimization of use of benzodiazepines given risk for confusion, falls  -no acute indication for new antidepressant medication  -encourage outpt psychiatric f/u - d/w daughter today  -will remain available. please contact with questions No

## 2022-03-20 ENCOUNTER — OUTPATIENT (OUTPATIENT)
Dept: OUTPATIENT SERVICES | Facility: HOSPITAL | Age: 52
LOS: 1 days | Discharge: HOME | End: 2022-03-20
Payer: COMMERCIAL

## 2022-03-20 DIAGNOSIS — R10.9 UNSPECIFIED ABDOMINAL PAIN: ICD-10-CM

## 2022-03-20 PROCEDURE — 76700 US EXAM ABDOM COMPLETE: CPT | Mod: 26

## 2024-09-21 ENCOUNTER — OUTPATIENT (OUTPATIENT)
Dept: OUTPATIENT SERVICES | Facility: HOSPITAL | Age: 54
LOS: 1 days | End: 2024-09-21
Payer: COMMERCIAL

## 2024-09-21 DIAGNOSIS — R10.9 UNSPECIFIED ABDOMINAL PAIN: ICD-10-CM

## 2024-09-21 DIAGNOSIS — Z00.8 ENCOUNTER FOR OTHER GENERAL EXAMINATION: ICD-10-CM

## 2024-09-21 PROCEDURE — 76700 US EXAM ABDOM COMPLETE: CPT | Mod: 26

## 2024-09-21 PROCEDURE — 76700 US EXAM ABDOM COMPLETE: CPT

## 2024-09-22 DIAGNOSIS — R10.9 UNSPECIFIED ABDOMINAL PAIN: ICD-10-CM
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
